# Patient Record
Sex: FEMALE | Race: WHITE | NOT HISPANIC OR LATINO
[De-identification: names, ages, dates, MRNs, and addresses within clinical notes are randomized per-mention and may not be internally consistent; named-entity substitution may affect disease eponyms.]

---

## 2021-04-12 ENCOUNTER — APPOINTMENT (OUTPATIENT)
Dept: DISASTER EMERGENCY | Facility: OTHER | Age: 81
End: 2021-04-12
Payer: MEDICARE

## 2021-04-12 PROCEDURE — 0012A: CPT

## 2021-12-12 ENCOUNTER — EMERGENCY (EMERGENCY)
Facility: HOSPITAL | Age: 81
LOS: 0 days | Discharge: HOME | End: 2021-12-13
Attending: EMERGENCY MEDICINE
Payer: MEDICARE

## 2021-12-12 VITALS
TEMPERATURE: 98 F | DIASTOLIC BLOOD PRESSURE: 77 MMHG | HEART RATE: 66 BPM | OXYGEN SATURATION: 95 % | WEIGHT: 164.91 LBS | HEIGHT: 62 IN | RESPIRATION RATE: 18 BRPM | SYSTOLIC BLOOD PRESSURE: 153 MMHG

## 2021-12-12 DIAGNOSIS — F41.9 ANXIETY DISORDER, UNSPECIFIED: ICD-10-CM

## 2021-12-12 DIAGNOSIS — G30.9 ALZHEIMER'S DISEASE, UNSPECIFIED: ICD-10-CM

## 2021-12-12 DIAGNOSIS — R51.9 HEADACHE, UNSPECIFIED: ICD-10-CM

## 2021-12-12 DIAGNOSIS — F03.90 UNSPECIFIED DEMENTIA WITHOUT BEHAVIORAL DISTURBANCE: ICD-10-CM

## 2021-12-12 DIAGNOSIS — R11.2 NAUSEA WITH VOMITING, UNSPECIFIED: ICD-10-CM

## 2021-12-12 LAB
ADD ON TEST-SPECIMEN IN LAB: SIGNIFICANT CHANGE UP
ALBUMIN SERPL ELPH-MCNC: 3.7 G/DL — SIGNIFICANT CHANGE UP (ref 3.3–5)
ALP SERPL-CCNC: 63 U/L — SIGNIFICANT CHANGE UP (ref 40–120)
ALT FLD-CCNC: 29 U/L — SIGNIFICANT CHANGE UP (ref 12–78)
ANION GAP SERPL CALC-SCNC: 5 MMOL/L — SIGNIFICANT CHANGE UP (ref 5–17)
APAP SERPL-MCNC: < 2 UG/ML (ref 10–30)
APPEARANCE UR: CLEAR — SIGNIFICANT CHANGE UP
AST SERPL-CCNC: 34 U/L — SIGNIFICANT CHANGE UP (ref 15–37)
BASOPHILS # BLD AUTO: 0.04 K/UL — SIGNIFICANT CHANGE UP (ref 0–0.2)
BASOPHILS NFR BLD AUTO: 0.6 % — SIGNIFICANT CHANGE UP (ref 0–2)
BILIRUB SERPL-MCNC: 0.4 MG/DL — SIGNIFICANT CHANGE UP (ref 0.2–1.2)
BILIRUB UR-MCNC: NEGATIVE — SIGNIFICANT CHANGE UP
BUN SERPL-MCNC: 10 MG/DL — SIGNIFICANT CHANGE UP (ref 7–23)
CALCIUM SERPL-MCNC: 9.5 MG/DL — SIGNIFICANT CHANGE UP (ref 8.5–10.1)
CHLORIDE SERPL-SCNC: 108 MMOL/L — SIGNIFICANT CHANGE UP (ref 96–108)
CO2 SERPL-SCNC: 28 MMOL/L — SIGNIFICANT CHANGE UP (ref 22–31)
COLOR SPEC: YELLOW — SIGNIFICANT CHANGE UP
CREAT SERPL-MCNC: 0.86 MG/DL — SIGNIFICANT CHANGE UP (ref 0.5–1.3)
DIFF PNL FLD: ABNORMAL
EOSINOPHIL # BLD AUTO: 0.24 K/UL — SIGNIFICANT CHANGE UP (ref 0–0.5)
EOSINOPHIL NFR BLD AUTO: 3.5 % — SIGNIFICANT CHANGE UP (ref 0–6)
ETHANOL SERPL-MCNC: <10 MG/DL — SIGNIFICANT CHANGE UP (ref 0–10)
GLUCOSE SERPL-MCNC: 112 MG/DL — HIGH (ref 70–99)
GLUCOSE UR QL: NEGATIVE MG/DL — SIGNIFICANT CHANGE UP
HCT VFR BLD CALC: 41.7 % — SIGNIFICANT CHANGE UP (ref 34.5–45)
HGB BLD-MCNC: 14.1 G/DL — SIGNIFICANT CHANGE UP (ref 11.5–15.5)
IMM GRANULOCYTES NFR BLD AUTO: 0.3 % — SIGNIFICANT CHANGE UP (ref 0–1.5)
KETONES UR-MCNC: NEGATIVE — SIGNIFICANT CHANGE UP
LEUKOCYTE ESTERASE UR-ACNC: NEGATIVE — SIGNIFICANT CHANGE UP
LYMPHOCYTES # BLD AUTO: 1.19 K/UL — SIGNIFICANT CHANGE UP (ref 1–3.3)
LYMPHOCYTES # BLD AUTO: 17.5 % — SIGNIFICANT CHANGE UP (ref 13–44)
MCHC RBC-ENTMCNC: 31.1 PG — SIGNIFICANT CHANGE UP (ref 27–34)
MCHC RBC-ENTMCNC: 33.8 GM/DL — SIGNIFICANT CHANGE UP (ref 32–36)
MCV RBC AUTO: 92.1 FL — SIGNIFICANT CHANGE UP (ref 80–100)
MONOCYTES # BLD AUTO: 0.66 K/UL — SIGNIFICANT CHANGE UP (ref 0–0.9)
MONOCYTES NFR BLD AUTO: 9.7 % — SIGNIFICANT CHANGE UP (ref 2–14)
NEUTROPHILS # BLD AUTO: 4.66 K/UL — SIGNIFICANT CHANGE UP (ref 1.8–7.4)
NEUTROPHILS NFR BLD AUTO: 68.4 % — SIGNIFICANT CHANGE UP (ref 43–77)
NITRITE UR-MCNC: NEGATIVE — SIGNIFICANT CHANGE UP
PH UR: 7 — SIGNIFICANT CHANGE UP (ref 5–8)
PLATELET # BLD AUTO: 246 K/UL — SIGNIFICANT CHANGE UP (ref 150–400)
POTASSIUM SERPL-MCNC: 4.5 MMOL/L — SIGNIFICANT CHANGE UP (ref 3.5–5.3)
POTASSIUM SERPL-SCNC: 4.5 MMOL/L — SIGNIFICANT CHANGE UP (ref 3.5–5.3)
PROT SERPL-MCNC: 7.4 GM/DL — SIGNIFICANT CHANGE UP (ref 6–8.3)
PROT UR-MCNC: NEGATIVE MG/DL — SIGNIFICANT CHANGE UP
RBC # BLD: 4.53 M/UL — SIGNIFICANT CHANGE UP (ref 3.8–5.2)
RBC # FLD: 13.2 % — SIGNIFICANT CHANGE UP (ref 10.3–14.5)
SALICYLATES SERPL-MCNC: <1.7 MG/DL — LOW (ref 2.8–20)
SODIUM SERPL-SCNC: 141 MMOL/L — SIGNIFICANT CHANGE UP (ref 135–145)
SP GR SPEC: 1.01 — SIGNIFICANT CHANGE UP (ref 1.01–1.02)
TSH SERPL-MCNC: 2.14 UU/ML — SIGNIFICANT CHANGE UP (ref 0.34–4.82)
UROBILINOGEN FLD QL: NEGATIVE MG/DL — SIGNIFICANT CHANGE UP
WBC # BLD: 6.81 K/UL — SIGNIFICANT CHANGE UP (ref 3.8–10.5)
WBC # FLD AUTO: 6.81 K/UL — SIGNIFICANT CHANGE UP (ref 3.8–10.5)

## 2021-12-12 PROCEDURE — 80307 DRUG TEST PRSMV CHEM ANLYZR: CPT

## 2021-12-12 PROCEDURE — 70450 CT HEAD/BRAIN W/O DYE: CPT | Mod: 26,MA

## 2021-12-12 PROCEDURE — 85610 PROTHROMBIN TIME: CPT

## 2021-12-12 PROCEDURE — 71045 X-RAY EXAM CHEST 1 VIEW: CPT

## 2021-12-12 PROCEDURE — 99285 EMERGENCY DEPT VISIT HI MDM: CPT

## 2021-12-12 PROCEDURE — 70450 CT HEAD/BRAIN W/O DYE: CPT | Mod: MA

## 2021-12-12 PROCEDURE — 93010 ELECTROCARDIOGRAM REPORT: CPT

## 2021-12-12 PROCEDURE — 36415 COLL VENOUS BLD VENIPUNCTURE: CPT

## 2021-12-12 PROCEDURE — 85652 RBC SED RATE AUTOMATED: CPT

## 2021-12-12 PROCEDURE — 85025 COMPLETE CBC W/AUTO DIFF WBC: CPT

## 2021-12-12 PROCEDURE — 80053 COMPREHEN METABOLIC PANEL: CPT

## 2021-12-12 PROCEDURE — 99283 EMERGENCY DEPT VISIT LOW MDM: CPT

## 2021-12-12 PROCEDURE — 99284 EMERGENCY DEPT VISIT MOD MDM: CPT | Mod: 25

## 2021-12-12 PROCEDURE — 85730 THROMBOPLASTIN TIME PARTIAL: CPT

## 2021-12-12 PROCEDURE — 81001 URINALYSIS AUTO W/SCOPE: CPT

## 2021-12-12 PROCEDURE — 93005 ELECTROCARDIOGRAM TRACING: CPT

## 2021-12-12 PROCEDURE — 71045 X-RAY EXAM CHEST 1 VIEW: CPT | Mod: 26

## 2021-12-12 PROCEDURE — 84443 ASSAY THYROID STIM HORMONE: CPT

## 2021-12-12 PROCEDURE — 87086 URINE CULTURE/COLONY COUNT: CPT

## 2021-12-12 RX ORDER — CITALOPRAM 10 MG/1
40 TABLET, FILM COATED ORAL DAILY
Refills: 0 | Status: DISCONTINUED | OUTPATIENT
Start: 2021-12-12 | End: 2021-12-13

## 2021-12-12 RX ORDER — QUETIAPINE FUMARATE 200 MG/1
25 TABLET, FILM COATED ORAL AT BEDTIME
Refills: 0 | Status: DISCONTINUED | OUTPATIENT
Start: 2021-12-12 | End: 2021-12-13

## 2021-12-12 RX ADMIN — QUETIAPINE FUMARATE 25 MILLIGRAM(S): 200 TABLET, FILM COATED ORAL at 20:13

## 2021-12-12 NOTE — ED PROVIDER NOTE - NSFOLLOWUPINSTRUCTIONS_ED_ALL_ED_FT
Follow-up with your regular physician  Return with any persistent/worsening symptoms.     Headache    A headache is pain or discomfort felt around the head or neck area. The specific cause of a headache may not be found as there are many types including tension headaches, migraine headaches, and cluster headaches. Watch your condition for any changes. Things you can do to manage your pain include taking over the counter and prescription medications as instructed by your health care provider, lying down in a dark quiet room, limiting stress, getting regular sleep, and refraining from alcohol and tobacco products.    SEEK IMMEDIATE MEDICAL CARE IF YOU HAVE ANY OF THE FOLLOWING SYMPTOMS: fever, vomiting, stiff neck, loss of vision, problems with speech, muscle weakness, loss of balance, trouble walking, passing out, or confusion.     Anxiety    WHAT YOU NEED TO KNOW:    Anxiety is a condition that causes you to feel extremely worried or nervous. The feelings are so strong that they can cause problems with your daily activities or sleep. Anxiety may be triggered by something you fear, or it may happen without a cause. Family or work stress, smoking, caffeine, and alcohol can increase your risk for anxiety. Certain medicines or health conditions can also increase your risk. Anxiety can become a long-term condition if it is not managed or treated.     DISCHARGE INSTRUCTIONS:    Call 911 if:     You have chest pain, tightness, or heaviness that may spread to your shoulders, arms, jaw, neck, or back.      You feel like hurting yourself or someone else.     Contact your healthcare provider if:     Your symptoms get worse or do not get better with treatment.      Your anxiety keeps you from doing your regular daily activities.      You have new symptoms since your last visit.      You have questions or concerns about your condition or care.    Medicines:     Medicines may be given to help you feel more calm and relaxed, and decrease your symptoms.      Take your medicine as directed. Contact your healthcare provider if you think your medicine is not helping or if you have side effects. Tell him of her if you are allergic to any medicine. Keep a list of the medicines, vitamins, and herbs you take. Include the amounts, and when and why you take them. Bring the list or the pill bottles to follow-up visits. Carry your medicine list with you in case of an emergency.    Follow up with your healthcare provider within 2 weeks or as directed: Write down your questions so you remember to ask them during your visits.    Manage anxiety:     Talk to someone about your anxiety. Your healthcare provider may suggest counseling. Cognitive behavioral therapy can help you understand and change how you react to events that trigger your symptoms. You might feel more comfortable talking with a friend or family member about your anxiety. Choose someone you know will be supportive and encouraging.      Find ways to relax. Activities such as exercise, meditation, or listening to music can help you relax. Spend time with friends, or do things you enjoy.      Practice deep breathing. Deep breathing can help you relax when you feel anxious. Focus on taking slow, deep breaths several times a day, or during an anxiety attack. Breathe in through your nose and out through your mouth.       Create a regular sleep routine. Regular sleep can help you feel calmer during the day. Go to sleep and wake up at the same times every day. Do not watch television or use the computer right before bed. Your room should be comfortable, dark, and quiet.       Eat a variety of healthy foods. Healthy foods include fruits, vegetables, low-fat dairy products, lean meats, fish, whole-grain breads, and cooked beans. Healthy foods can help you feel less anxious and have more energy.      Exercise regularly. Exercise can increase your energy level. Exercise may also lift your mood and help you sleep better. Your healthcare provider can help you create an exercise plan.      Do not smoke. Nicotine and other chemicals in cigarettes and cigars can increase anxiety. Ask your healthcare provider for information if you currently smoke and need help to quit. E-cigarettes or smokeless tobacco still contain nicotine. Talk to your healthcare provider before you use these products.       Do not have caffeine. Caffeine can make your symptoms worse. Do not have foods or drinks that are meant to increase your energy level.      Limit or do not drink alcohol. Ask your healthcare provider if alcohol is safe for you. You may not be able to drink alcohol if you take certain anxiety or depression medicines. Limit alcohol to 1 drink per day if you are a woman. Limit alcohol to 2 drinks per day if you are a man. A drink of alcohol is 12 ounces of beer, 5 ounces of wine, or 1½ ounces of liquor.       Do not use drugs. Drugs can make your anxiety worse. It can also make anxiety hard to manage. Talk to your healthcare provider if you use drugs and want help to quit. FOLLOW UP WITH PMD & PSYCHIATRIST  WITHIN 1-2DAYS, CALL TO MAKE APPOINTMENT  COME BACK TO ED IF YOUR CONDITION WORSENS OR IF YOU DEVELOP FEVER GREATER THAN 100.4F, CHEST PAIN,  SHORTNESS OF BREATH OR ANY OTHER SYMPTOMS CONCERNING TO YOU  TAKE TYLENOL (ACETAMINOPHEN) 650 MG EVERY 6 HOURS AS NEEDED FOR PAIN    IF YOU WERE PRESRCIBED ANY MEDICATIONS FROM TODAY'S VISIT, TAKE THEM AS DIRECTED (seroquel)      Follow-up with your regular physician  Return with any persistent/worsening symptoms.     Headache    A headache is pain or discomfort felt around the head or neck area. The specific cause of a headache may not be found as there are many types including tension headaches, migraine headaches, and cluster headaches. Watch your condition for any changes. Things you can do to manage your pain include taking over the counter and prescription medications as instructed by your health care provider, lying down in a dark quiet room, limiting stress, getting regular sleep, and refraining from alcohol and tobacco products.    SEEK IMMEDIATE MEDICAL CARE IF YOU HAVE ANY OF THE FOLLOWING SYMPTOMS: fever, vomiting, stiff neck, loss of vision, problems with speech, muscle weakness, loss of balance, trouble walking, passing out, or confusion.     Anxiety    WHAT YOU NEED TO KNOW:    Anxiety is a condition that causes you to feel extremely worried or nervous. The feelings are so strong that they can cause problems with your daily activities or sleep. Anxiety may be triggered by something you fear, or it may happen without a cause. Family or work stress, smoking, caffeine, and alcohol can increase your risk for anxiety. Certain medicines or health conditions can also increase your risk. Anxiety can become a long-term condition if it is not managed or treated.     DISCHARGE INSTRUCTIONS:    Call 911 if:     You have chest pain, tightness, or heaviness that may spread to your shoulders, arms, jaw, neck, or back.      You feel like hurting yourself or someone else.     Contact your healthcare provider if:     Your symptoms get worse or do not get better with treatment.      Your anxiety keeps you from doing your regular daily activities.      You have new symptoms since your last visit.      You have questions or concerns about your condition or care.    Medicines:     Medicines may be given to help you feel more calm and relaxed, and decrease your symptoms.      Take your medicine as directed. Contact your healthcare provider if you think your medicine is not helping or if you have side effects. Tell him of her if you are allergic to any medicine. Keep a list of the medicines, vitamins, and herbs you take. Include the amounts, and when and why you take them. Bring the list or the pill bottles to follow-up visits. Carry your medicine list with you in case of an emergency.    Follow up with your healthcare provider within 2 weeks or as directed: Write down your questions so you remember to ask them during your visits.    Manage anxiety:     Talk to someone about your anxiety. Your healthcare provider may suggest counseling. Cognitive behavioral therapy can help you understand and change how you react to events that trigger your symptoms. You might feel more comfortable talking with a friend or family member about your anxiety. Choose someone you know will be supportive and encouraging.      Find ways to relax. Activities such as exercise, meditation, or listening to music can help you relax. Spend time with friends, or do things you enjoy.      Practice deep breathing. Deep breathing can help you relax when you feel anxious. Focus on taking slow, deep breaths several times a day, or during an anxiety attack. Breathe in through your nose and out through your mouth.       Create a regular sleep routine. Regular sleep can help you feel calmer during the day. Go to sleep and wake up at the same times every day. Do not watch television or use the computer right before bed. Your room should be comfortable, dark, and quiet.       Eat a variety of healthy foods. Healthy foods include fruits, vegetables, low-fat dairy products, lean meats, fish, whole-grain breads, and cooked beans. Healthy foods can help you feel less anxious and have more energy.      Exercise regularly. Exercise can increase your energy level. Exercise may also lift your mood and help you sleep better. Your healthcare provider can help you create an exercise plan.      Do not smoke. Nicotine and other chemicals in cigarettes and cigars can increase anxiety. Ask your healthcare provider for information if you currently smoke and need help to quit. E-cigarettes or smokeless tobacco still contain nicotine. Talk to your healthcare provider before you use these products.       Do not have caffeine. Caffeine can make your symptoms worse. Do not have foods or drinks that are meant to increase your energy level.      Limit or do not drink alcohol. Ask your healthcare provider if alcohol is safe for you. You may not be able to drink alcohol if you take certain anxiety or depression medicines. Limit alcohol to 1 drink per day if you are a woman. Limit alcohol to 2 drinks per day if you are a man. A drink of alcohol is 12 ounces of beer, 5 ounces of wine, or 1½ ounces of liquor.       Do not use drugs. Drugs can make your anxiety worse. It can also make anxiety hard to manage. Talk to your healthcare provider if you use drugs and want help to quit.

## 2021-12-12 NOTE — ED PROVIDER NOTE - NEUROLOGICAL, MLM
Mild confusion but at baseline. No focal deficits, no motor or sensory deficits. +resting tremor in left hand.

## 2021-12-12 NOTE — ED STATDOCS - PROGRESS NOTE DETAILS
Brandi FERMIN for ED attending, Dr. Dia: 80 y/o F with PMHx of dementia presents to the ED BIB family for eval of +tremors and +HA. Also with reported increased +anxiety. Pt poor historian 2/2 baseline dementia, hx obtained from family at bedside. Will send pt to main ED for further evaluation.

## 2021-12-12 NOTE — ED ADULT TRIAGE NOTE - CHIEF COMPLAINT QUOTE
c/o headache, tremors and c/o pain behind eyes, was started on xanax and celexa recently, was taken off resperidone  HX: macular degeneration, dementia with psychosis, currently flat affect in triage

## 2021-12-12 NOTE — ED BEHAVIORAL HEALTH ASSESSMENT NOTE - RISK ASSESSMENT
Pt has become increasingly confused as per family increasing her risk to harm self. Family support is a protective factor. She has a HHA and family that cares for her. Low Acute Suicide Risk

## 2021-12-12 NOTE — ED PROVIDER NOTE - OBJECTIVE STATEMENT
80 y/o female with PMHx of macular degeneration, dementia presents to the ED c/o headache. Frontal HA w/ no associated n/v.  Pt endorses increased anxiety and tremor. Was recently taken off of risperidone and started on xanax and increased celexa. No recent falls/injuries, fevers.

## 2021-12-12 NOTE — ED BEHAVIORAL HEALTH ASSESSMENT NOTE - DETAILS
spoke with MD and RN denies suicidal ideations at this time spoke with RN and MD had visual hallucinations on Risperdal

## 2021-12-12 NOTE — ED BEHAVIORAL HEALTH ASSESSMENT NOTE - REASON
Pt is not at baseline. She has endorsed increasing anxiety, has been more confused and has endorsed suicidal ideations at home

## 2021-12-12 NOTE — ED ADULT NURSE NOTE - OBJECTIVE STATEMENT
patient presents with HA x one week.  Recent adjustment in SSRI, feeling tremulous, increased anxiety.

## 2021-12-12 NOTE — ED ADULT NURSE NOTE - NSIMPLEMENTINTERV_GEN_ALL_ED
Implemented All Fall Risk Interventions:  [place] to call system. Call bell, personal items and telephone within reach. Instruct patient to call for assistance. Room bathroom lighting operational. Non-slip footwear when patient is off stretcher. Physically safe environment: no spills, clutter or unnecessary equipment. Stretcher in lowest position, wheels locked, appropriate side rails in place. Provide visual cue, wrist band, yellow gown, etc. Monitor gait and stability. Monitor for mental status changes and reorient to person, place, and time. Review medications for side effects contributing to fall risk. Reinforce activity limits and safety measures with patient and family.

## 2021-12-12 NOTE — ED PROVIDER NOTE - PROGRESS NOTE DETAILS
CT, labs unremarkable (incl ESR 2). Patient awaiting psych eval.  Is expressing desire to be discharged -- no SI/HI, will try to get ETA from psych but would not hold her for eval if she insists on d/c. CT, labs unremarkable (incl ESR 2). Patient awaiting psych eval. No medical contraindication for psych eval/admission (if warranted). CC:  Signout received from Dr. Card to f/u Psych plan.  Telepsychiatry advises pt to be held overnight for Psych day team to reassess in AM.  Tele-Psych submitted psych med orders. pablito; dr baez saw pt & rec's d/c with seroquel, son comfortable taking home Ruy Angela: pt endorsed to me from prior physician PENDING: psych revval for final dispo

## 2021-12-12 NOTE — ED PROVIDER NOTE - PATIENT PORTAL LINK FT
You can access the FollowMyHealth Patient Portal offered by [facility] by registering at the following website: [email or web address]. By joining [facility]’s FollowMyHealth portal, you will also be able to view your health information using other applications (apps) compatible with our system.

## 2021-12-12 NOTE — ED ADULT NURSE REASSESSMENT NOTE - NS ED NURSE REASSESS COMMENT FT1
Patient resting in room with son at bedside. Pt appears anxious at this time. Patient updated on plan for new medication tonight before bed. Pt denies further needs at this time. Will continue to monitor.

## 2021-12-12 NOTE — ED BEHAVIORAL HEALTH ASSESSMENT NOTE - HPI (INCLUDE ILLNESS QUALITY, SEVERITY, DURATION, TIMING, CONTEXT, MODIFYING FACTORS, ASSOCIATED SIGNS AND SYMPTOMS)
Pt is a 81 year old female, domiciled with  who also suffers from dementia, with past psychiatric history of dementia, and PMH of macular degeneration, who was BIB family for evaluation of worsening cognition and anxiety.     Pt reported she is doing okay. She does feel anxious. She wants to go back home. She denied any other complaints or concerns. She denied suicidal or homicidal ideations, AVH at this time.     Collateral obtained from sons Ludwig and José who reported that pt behavior has changed over the past month. She is staying up at night and has become increasingly anxious. She started endorsing suicidal ideations earlier this week stating "I don't want to live anymore.. I don't feel like me." They reported pt was started on Risperdal a month ago by her PMD which was later discontinued as she started experiencing visual hallucinations. She was started on Xanax 0.25mg a week ago and her Celexa was increased from 40mg to 60mg. Her anxiety has worsened since.     We discussed medication changes and decided to stop xanax and decrease the celexa back to 40mg since pt was stable on that dose for many years. We will start Seroquel 25mg po qpm to target her mood and sleep. Risks/benefits of medication were discussed with family who were in agreement with the plan. Pt will be kept under observation and reevaluated.

## 2021-12-12 NOTE — ED BEHAVIORAL HEALTH ASSESSMENT NOTE - SUMMARY
Pt is a 81 year old female with dementia who was brought in by family for assessment of her worsening anxiety and mood symptoms. Pt has been increasingly confused at home. She has had multiple medication changes in the last month which have probably contributed to her current symptoms. We discussed discontinuing xanax and decreasing Celexa to 40mg po daily. Pt will be started on Seroquel 25mg po bedtime to target her mood symptoms and insomnia.    Plan:   Pt will be kept under observation  Discontinue Xanax  Start Celexa 40mg po daily for depression  Start Seroquel 25mg po bedtime for aggression/mood symptoms/insomnia  Continue medical medications - Levothyroxine 25mcg po daily and Pravastatin 40mg po qpm  f/u labs and UA  Pt to be reevaluated by CL team

## 2021-12-13 VITALS
RESPIRATION RATE: 18 BRPM | HEART RATE: 70 BPM | DIASTOLIC BLOOD PRESSURE: 65 MMHG | SYSTOLIC BLOOD PRESSURE: 144 MMHG | TEMPERATURE: 98 F | OXYGEN SATURATION: 97 %

## 2021-12-13 LAB
CULTURE RESULTS: SIGNIFICANT CHANGE UP
SPECIMEN SOURCE: SIGNIFICANT CHANGE UP

## 2021-12-13 RX ORDER — QUETIAPINE FUMARATE 200 MG/1
1 TABLET, FILM COATED ORAL
Qty: 14 | Refills: 0
Start: 2021-12-13 | End: 2021-12-26

## 2021-12-13 NOTE — PROGRESS NOTE BEHAVIORAL HEALTH - NSBHFUPINTERVALHXFT_PSY_A_CORE
Son Ludwig at the bedside.   Pt is sitting in the chair. Son state s[pt si very anxious. Pt agrees with that  and also feels depressed. She slept well last night wits Seroquel 25 mg. Celexa was lowered to 40mg. Pt denies any type of SI, HI, Ah, Vh. She has no PI.    PT has dementia for a "several years " but at the state of forgetting her  , who is also demented, not verbal and homes withy living  in aide.   PT is easily overwhelmed with catastrophic responses , tries to leave the house.  Family already consulted eldercare  . pt has medicaid  bot not her own aide.   She has short and long ter memory problems

## 2021-12-13 NOTE — PROGRESS NOTE BEHAVIORAL HEALTH - SUMMARY
Pt is a 81 year old female with dementia who was brought in by family for assessment of her worsening anxiety and mood symptoms. Pt has been increasingly confused at home. She has had multiple medication changes in the last month which have probably contributed to her current symptoms. We discussed discontinuing xanax and decreasing Celexa to 40mg po daily. Pt will be started on Seroquel 25mg po bedtime to target her mood symptoms and insomnia.    PT is not at imminent t risk to harm self and others and she does not need inpatient treatment.   Plan:   discussed with son ad Dr Fran Redmond.   Would continue with Seroquel 25 mg at HS since pt had a good response. Side effects and benefits discussed. Son agrees.   Provided with phone number for [facility] [phone] and advised to follow up with geriatric psychiatry. Son agrees and will  mark for [id number].   PT is psychiatrically  cleared for d/c.

## 2021-12-13 NOTE — ED ADULT NURSE REASSESSMENT NOTE - NS ED NURSE REASSESS COMMENT FT1
Patient able to ambulate to bathroom assist x1. Patient appears calm and relaxed at this time. Will continue to monitor.

## 2021-12-13 NOTE — ED ADULT NURSE REASSESSMENT NOTE - NS ED NURSE REASSESS COMMENT FT1
Patient resting comfortably in bed. No distress noted. Respirations even and unlabored. Skin warm and dry. Will continue to monitor.

## 2021-12-13 NOTE — PROGRESS NOTE BEHAVIORAL HEALTH - NSBHCHARTREVIEWLAB_PSY_A_CORE FT
14.1   6.81  )-----------( 246      ( 12 Dec 2021 14:33 )             41.7   12-12    141  |  108  |  10  ----------------------------<  112<H>  4.5   |  28  |  0.86    Ca    9.5      12 Dec 2021 14:33    TPro  7.4  /  Alb  3.7  /  TBili  0.4  /  DBili  x   /  AST  34  /  ALT  29  /  AlkPhos  63  12-12

## 2021-12-13 NOTE — PROGRESS NOTE BEHAVIORAL HEALTH - RISK ASSESSMENT
Low acute level: NO SI, gets anxious and catastrophes , demented, short and long term memory problem executive functions problems.   Low long term risk.; PT has no previous psychiatric hx, hx of SA and makes vague states that she does not want to live any more. Family is supportive.   Protective factors: supportive family.  Mitigation: OPD f/u

## 2021-12-13 NOTE — PROGRESS NOTE BEHAVIORAL HEALTH - NSBHCHARTREVIEWINVESTIGATE_PSY_A_CORE FT
entricular Rate 64 BPM    Atrial Rate 64 BPM    P-R Interval 236 ms    QRS Duration 70 ms    Q-T Interval 388 ms    QTC Calculation(Bazett) 400 ms    P Axis 27 degrees    R Axis -2 degrees    T Axis 7 degrees    Diagnosis Line Sinus rhythm with 1st degree A-V block  Inferior infarct , age undetermined  Anterior infarct , age undetermined  Abnormal ECG

## 2021-12-13 NOTE — PROGRESS NOTE BEHAVIORAL HEALTH - NSBHCONSULTFOLLOWAFTERCARE_PSY_A_CORE FT
Provided with phone number for [facility] [phone] and advised to follow up with geriatric psychiatry. Son agrees and will  mark for [id number].   PT is psychiatrically  cleared for d/c.

## 2021-12-13 NOTE — ED ADULT NURSE REASSESSMENT NOTE - NS ED NURSE REASSESS COMMENT FT1
Received patient from Brittnee VAZQUEZ pt resting in bed comfortable with no complaints. will continue to monitor

## 2021-12-13 NOTE — PROGRESS NOTE BEHAVIORAL HEALTH - NSBHCHARTREVIEWIMAGING_PSY_A_CORE FT
INTERPRETATION:  Noncontrast CT of the brain.    CLINICAL INDICATION:  Headache and tremor    TECHNIQUE : Axial CT scanning of the brain was obtained from the skull   base to the vertex without the administration of intravenous contrast.    COMPARISON: None available    FINDINGS:  No acute hemorrhage, hydrocephalus, midline shift or   extra-axial collections are identified. Age-appropriate involutional   changes are present. Patchy areas of decreased attenuation are noted   within the white matter likely representing microvascular ischemic change.    The orbits are not remarkable in appearance.    The visualized paranasal sinuses and tympanomastoid cavities are free of   acute disease.    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    IMPRESSION:    No acute hemorrhage, extra-axial collection, mass effect or hydrocephalus.

## 2021-12-13 NOTE — PROGRESS NOTE BEHAVIORAL HEALTH - NSBHCHARTREVIEWVS_PSY_A_CORE FT
Vital Signs Last 24 Hrs  T(C): 36.8 (13 Dec 2021 10:02), Max: 36.9 (12 Dec 2021 13:31)  T(F): 98.3 (13 Dec 2021 10:02), Max: 98.5 (12 Dec 2021 13:31)  HR: 70 (13 Dec 2021 10:02) (62 - 73)  BP: 144/65 (13 Dec 2021 10:02) (144/65 - 155/72)  BP(mean): 90 (13 Dec 2021 07:20) (90 - 94)  RR: 18 (13 Dec 2021 10:02) (18 - 20)  SpO2: 97% (13 Dec 2021 10:02) (95% - 97%)

## 2022-02-23 ENCOUNTER — EMERGENCY (EMERGENCY)
Facility: HOSPITAL | Age: 82
LOS: 0 days | Discharge: HOME | End: 2022-02-23
Attending: EMERGENCY MEDICINE
Payer: MEDICARE

## 2022-02-23 VITALS
RESPIRATION RATE: 16 BRPM | OXYGEN SATURATION: 96 % | HEART RATE: 82 BPM | TEMPERATURE: 99 F | SYSTOLIC BLOOD PRESSURE: 133 MMHG | DIASTOLIC BLOOD PRESSURE: 76 MMHG

## 2022-02-23 VITALS — WEIGHT: 119.93 LBS | HEIGHT: 62 IN

## 2022-02-23 DIAGNOSIS — R41.0 DISORIENTATION, UNSPECIFIED: ICD-10-CM

## 2022-02-23 DIAGNOSIS — R44.3 HALLUCINATIONS, UNSPECIFIED: ICD-10-CM

## 2022-02-23 DIAGNOSIS — F03.91 UNSPECIFIED DEMENTIA WITH BEHAVIORAL DISTURBANCE: ICD-10-CM

## 2022-02-23 DIAGNOSIS — B96.89 OTHER SPECIFIED BACTERIAL AGENTS AS THE CAUSE OF DISEASES CLASSIFIED ELSEWHERE: ICD-10-CM

## 2022-02-23 DIAGNOSIS — N39.0 URINARY TRACT INFECTION, SITE NOT SPECIFIED: ICD-10-CM

## 2022-02-23 LAB
ALBUMIN SERPL ELPH-MCNC: 3.2 G/DL — LOW (ref 3.3–5)
ALP SERPL-CCNC: 72 U/L — SIGNIFICANT CHANGE UP (ref 40–120)
ALT FLD-CCNC: 22 U/L — SIGNIFICANT CHANGE UP (ref 12–78)
ANION GAP SERPL CALC-SCNC: 7 MMOL/L — SIGNIFICANT CHANGE UP (ref 5–17)
APAP SERPL-MCNC: <2 UG/ML — LOW (ref 10–30)
APPEARANCE UR: CLEAR — SIGNIFICANT CHANGE UP
AST SERPL-CCNC: 42 U/L — HIGH (ref 15–37)
BASOPHILS # BLD AUTO: 0.04 K/UL — SIGNIFICANT CHANGE UP (ref 0–0.2)
BASOPHILS NFR BLD AUTO: 0.6 % — SIGNIFICANT CHANGE UP (ref 0–2)
BILIRUB SERPL-MCNC: 0.9 MG/DL — SIGNIFICANT CHANGE UP (ref 0.2–1.2)
BILIRUB UR-MCNC: NEGATIVE — SIGNIFICANT CHANGE UP
BUN SERPL-MCNC: 10 MG/DL — SIGNIFICANT CHANGE UP (ref 7–23)
CALCIUM SERPL-MCNC: 9 MG/DL — SIGNIFICANT CHANGE UP (ref 8.5–10.1)
CHLORIDE SERPL-SCNC: 102 MMOL/L — SIGNIFICANT CHANGE UP (ref 96–108)
CO2 SERPL-SCNC: 28 MMOL/L — SIGNIFICANT CHANGE UP (ref 22–31)
COLOR SPEC: YELLOW — SIGNIFICANT CHANGE UP
CREAT SERPL-MCNC: 0.94 MG/DL — SIGNIFICANT CHANGE UP (ref 0.5–1.3)
DIFF PNL FLD: ABNORMAL
EOSINOPHIL # BLD AUTO: 0.01 K/UL — SIGNIFICANT CHANGE UP (ref 0–0.5)
EOSINOPHIL NFR BLD AUTO: 0.1 % — SIGNIFICANT CHANGE UP (ref 0–6)
ETHANOL SERPL-MCNC: <10 MG/DL — SIGNIFICANT CHANGE UP (ref 0–10)
GLUCOSE SERPL-MCNC: 119 MG/DL — HIGH (ref 70–99)
GLUCOSE UR QL: NEGATIVE — SIGNIFICANT CHANGE UP
HCT VFR BLD CALC: 35.8 % — SIGNIFICANT CHANGE UP (ref 34.5–45)
HGB BLD-MCNC: 12.1 G/DL — SIGNIFICANT CHANGE UP (ref 11.5–15.5)
IMM GRANULOCYTES NFR BLD AUTO: 0.3 % — SIGNIFICANT CHANGE UP (ref 0–1.5)
KETONES UR-MCNC: ABNORMAL
LEUKOCYTE ESTERASE UR-ACNC: ABNORMAL
LYMPHOCYTES # BLD AUTO: 0.85 K/UL — LOW (ref 1–3.3)
LYMPHOCYTES # BLD AUTO: 12.6 % — LOW (ref 13–44)
MCHC RBC-ENTMCNC: 30.3 PG — SIGNIFICANT CHANGE UP (ref 27–34)
MCHC RBC-ENTMCNC: 33.8 GM/DL — SIGNIFICANT CHANGE UP (ref 32–36)
MCV RBC AUTO: 89.7 FL — SIGNIFICANT CHANGE UP (ref 80–100)
MONOCYTES # BLD AUTO: 0.87 K/UL — SIGNIFICANT CHANGE UP (ref 0–0.9)
MONOCYTES NFR BLD AUTO: 12.9 % — SIGNIFICANT CHANGE UP (ref 2–14)
NEUTROPHILS # BLD AUTO: 4.94 K/UL — SIGNIFICANT CHANGE UP (ref 1.8–7.4)
NEUTROPHILS NFR BLD AUTO: 73.5 % — SIGNIFICANT CHANGE UP (ref 43–77)
NITRITE UR-MCNC: NEGATIVE — SIGNIFICANT CHANGE UP
PH UR: 7 — SIGNIFICANT CHANGE UP (ref 5–8)
PLATELET # BLD AUTO: 202 K/UL — SIGNIFICANT CHANGE UP (ref 150–400)
POTASSIUM SERPL-MCNC: 3.6 MMOL/L — SIGNIFICANT CHANGE UP (ref 3.5–5.3)
POTASSIUM SERPL-SCNC: 3.6 MMOL/L — SIGNIFICANT CHANGE UP (ref 3.5–5.3)
PROT SERPL-MCNC: 6.7 GM/DL — SIGNIFICANT CHANGE UP (ref 6–8.3)
PROT UR-MCNC: 15
RBC # BLD: 3.99 M/UL — SIGNIFICANT CHANGE UP (ref 3.8–5.2)
RBC # FLD: 13.2 % — SIGNIFICANT CHANGE UP (ref 10.3–14.5)
SALICYLATES SERPL-MCNC: <1.7 MG/DL — LOW (ref 2.8–20)
SODIUM SERPL-SCNC: 137 MMOL/L — SIGNIFICANT CHANGE UP (ref 135–145)
SP GR SPEC: 1 — LOW (ref 1.01–1.02)
TROPONIN I, HIGH SENSITIVITY RESULT: 11.52 NG/L — SIGNIFICANT CHANGE UP
UROBILINOGEN FLD QL: NEGATIVE — SIGNIFICANT CHANGE UP
WBC # BLD: 6.73 K/UL — SIGNIFICANT CHANGE UP (ref 3.8–10.5)
WBC # FLD AUTO: 6.73 K/UL — SIGNIFICANT CHANGE UP (ref 3.8–10.5)

## 2022-02-23 PROCEDURE — 84484 ASSAY OF TROPONIN QUANT: CPT

## 2022-02-23 PROCEDURE — 99285 EMERGENCY DEPT VISIT HI MDM: CPT

## 2022-02-23 PROCEDURE — 93005 ELECTROCARDIOGRAM TRACING: CPT

## 2022-02-23 PROCEDURE — 99285 EMERGENCY DEPT VISIT HI MDM: CPT | Mod: 25

## 2022-02-23 PROCEDURE — 81001 URINALYSIS AUTO W/SCOPE: CPT

## 2022-02-23 PROCEDURE — 85025 COMPLETE CBC W/AUTO DIFF WBC: CPT

## 2022-02-23 PROCEDURE — 70450 CT HEAD/BRAIN W/O DYE: CPT | Mod: 26,MA

## 2022-02-23 PROCEDURE — 36415 COLL VENOUS BLD VENIPUNCTURE: CPT

## 2022-02-23 PROCEDURE — 80053 COMPREHEN METABOLIC PANEL: CPT

## 2022-02-23 PROCEDURE — 71045 X-RAY EXAM CHEST 1 VIEW: CPT | Mod: 26

## 2022-02-23 PROCEDURE — 84443 ASSAY THYROID STIM HORMONE: CPT

## 2022-02-23 PROCEDURE — 93010 ELECTROCARDIOGRAM REPORT: CPT

## 2022-02-23 PROCEDURE — 71045 X-RAY EXAM CHEST 1 VIEW: CPT

## 2022-02-23 PROCEDURE — 80307 DRUG TEST PRSMV CHEM ANLYZR: CPT

## 2022-02-23 PROCEDURE — 70450 CT HEAD/BRAIN W/O DYE: CPT | Mod: MA

## 2022-02-23 PROCEDURE — 87040 BLOOD CULTURE FOR BACTERIA: CPT

## 2022-02-23 PROCEDURE — 87086 URINE CULTURE/COLONY COUNT: CPT

## 2022-02-23 RX ORDER — HALOPERIDOL DECANOATE 100 MG/ML
5 INJECTION INTRAMUSCULAR ONCE
Refills: 0 | Status: DISCONTINUED | OUTPATIENT
Start: 2022-02-23 | End: 2022-02-23

## 2022-02-23 RX ORDER — NITROFURANTOIN MACROCRYSTAL 50 MG
1 CAPSULE ORAL
Qty: 14 | Refills: 0
Start: 2022-02-23 | End: 2022-03-01

## 2022-02-23 RX ORDER — MIRTAZAPINE 45 MG/1
15 TABLET, ORALLY DISINTEGRATING ORAL AT BEDTIME
Refills: 0 | Status: DISCONTINUED | OUTPATIENT
Start: 2022-02-23 | End: 2022-02-23

## 2022-02-23 RX ORDER — NITROFURANTOIN MACROCRYSTAL 50 MG
100 CAPSULE ORAL ONCE
Refills: 0 | Status: COMPLETED | OUTPATIENT
Start: 2022-02-23 | End: 2022-02-23

## 2022-02-23 RX ORDER — QUETIAPINE FUMARATE 200 MG/1
25 TABLET, FILM COATED ORAL ONCE
Refills: 0 | Status: COMPLETED | OUTPATIENT
Start: 2022-02-23 | End: 2022-02-23

## 2022-02-23 RX ADMIN — Medication 1 MILLIGRAM(S): at 15:52

## 2022-02-23 RX ADMIN — QUETIAPINE FUMARATE 25 MILLIGRAM(S): 200 TABLET, FILM COATED ORAL at 15:52

## 2022-02-23 RX ADMIN — Medication 100 MILLIGRAM(S): at 23:08

## 2022-02-23 RX ADMIN — MIRTAZAPINE 15 MILLIGRAM(S): 45 TABLET, ORALLY DISINTEGRATING ORAL at 21:04

## 2022-02-23 NOTE — ED ADULT NURSE REASSESSMENT NOTE - NS ED NURSE REASSESS COMMENT FT1
Pt calm and resting comfortably. No complaints at this time. VSS, respirations equal and unlabored. HR 82 and /76. Son at the bedside.

## 2022-02-23 NOTE — ED BEHAVIORAL HEALTH ASSESSMENT NOTE - HPI (INCLUDE ILLNESS QUALITY, SEVERITY, DURATION, TIMING, CONTEXT, MODIFYING FACTORS, ASSOCIATED SIGNS AND SYMPTOMS)
81 year-old  female, with history of Dementia, Anxiety, Depression, history of Celexa for ~ 40 years, no in-patient hospitalization, no suicidal ideation or suicide attempt, referred and brought in by family for anxiety, emotional distress, insomnia.    Patient is alert to self, but not time, place or situation. Patient has fluctuating sensorium, inattention. Patient is restless, anxious. Patient given Seroquel 25 mg and Ativan 1 mg: Patient anxiety reduced however remains pacing in room.     Collateral reports worsening of anxiety symptoms ~ December, becoming more difficult to manage at home. Patient has been labile, anxious, crying, asking for God to take her; however no self-injurious behaviors or suicide attempt; no threats of ending life. Patient has not been sleeping well. On way here, patient was talking to glove compartment, "hallucinating." 81 year-old  female, with history of Dementia, Anxiety, Depression, history of Celexa for ~ 40 years, no in-patient hospitalization, no suicidal ideation or suicide attempt, referred and brought in by family for anxiety, emotional distress, insomnia.    Patient is alert to self, but not time, place or situation. Patient has fluctuating sensorium, inattention. Patient is restless, anxious. Patient given Seroquel 25 mg and Ativan 1 mg: Patient anxiety reduced however remains pacing in room.     Collateral reports worsening of anxiety symptoms ~ December, becoming more difficult to manage at home. Patient has been labile, anxious, crying, asking for God to take her; however no self-injurious behaviors or suicide attempt; no threats of ending life. Patient has not been sleeping well. On way here, patient was talking to glove compartment, "hallucinating." Patient symptoms worsening ~ 12.2021 and medications have not helped much. Family seeking medication changes / augmentation.

## 2022-02-23 NOTE — ED BEHAVIORAL HEALTH ASSESSMENT NOTE - DESCRIPTION
As per HPI As per HPI     Vital Signs Last 24 Hrs  T(C): 37.2 (23 Feb 2022 14:07), Max: 37.2 (23 Feb 2022 14:07)  T(F): 98.9 (23 Feb 2022 14:07), Max: 98.9 (23 Feb 2022 14:07)  HR: 99 (23 Feb 2022 14:07) (99 - 99)  BP: 124/94 (23 Feb 2022 14:07) (124/94 - 124/94)  BP(mean): 103 (23 Feb 2022 14:07) (103 - 103)  RR: 18 (23 Feb 2022 14:07) (18 - 18)  SpO2: 95% (23 Feb 2022 14:07) (95% - 95%)

## 2022-02-23 NOTE — ED ADULT TRIAGE NOTE - CHIEF COMPLAINT QUOTE
pt presents to ED for possible pysch eval. As per grandson pt is rambling, not making sense, and having hallucinations. pt has been dealing with severe anxiety and wishing she were dead. pt experiencing new onset of loss of balance/ coordination and decreased PO intake. pt psychiatrist ANNE Barajas ([facility]) #[phone]. Family requesting r/o UTI.

## 2022-02-23 NOTE — ED ADULT NURSE NOTE - OBJECTIVE STATEMENT
pt presents to ED for possible pysch eval. As per grandson pt is rambling, not making sense, and having hallucinations. pt has been dealing with severe anxiety and wishing she were dead. pt experiencing new onset of loss of balance/ coordination and decreased PO intake. pt psychiatrist ANNE Barajas ([facility]) #[phone]. Family requesting r/o UTI.

## 2022-02-23 NOTE — ED PROVIDER NOTE - PSYCHIATRIC, MLM
Alert and oriented to person, place, time/situation. normal mood and affect. no apparent risk to self or others. Alert and oriented x1. Pt walking around room and hallucinating.

## 2022-02-23 NOTE — ED ADULT NURSE NOTE - CHIEF COMPLAINT QUOTE
pt presents to ED for possible pysch eval. As per grandson pt is rambling, not making sense, and having hallucinations. pt has been dealing with severe anxiety and wishing she were dead. pt experiencing new onset of loss of balance/ coordination and decreased PO intake. pt psychiatrist ANNE Barajas ([facility]) #[phone]. Family requesting r/o UTI.

## 2022-02-23 NOTE — ED BEHAVIORAL HEALTH ASSESSMENT NOTE - CURRENT MEDICATION
Lexapro 10 mg daily, Seroquel 25 mg daily and 50 mg at bedtime, Remeron 15 mg at bedtime  Vitamin / Supplements: L-Theanine, Ashwaghanda, Melatonin

## 2022-02-23 NOTE — ED PROVIDER NOTE - OBJECTIVE STATEMENT
80 y/o female with a PMHx of Alzheimer's presents to the ED for psych eval. Per family member at bedside, pt has been confused and having hallucinations. Denies abd pain. No other complaints at this time.

## 2022-02-23 NOTE — ED PROVIDER NOTE - CONSTITUTIONAL, MLM
normal... Well appearing, awake, alert, oriented to person, place, time/situation and in no apparent distress. Well appearing, awake, alert, oriented x1 and in no apparent distress.

## 2022-02-23 NOTE — ED ADULT NURSE NOTE - NSFALLRSKPASTHIST_ED_ALL_ED
Patient:   MADELYN MOSS            MRN: [id number]            FIN: [id number]              Age:   86 years     Sex:  MALE     :  37   Associated Diagnoses:   None   Author:   ROBERT ALEGRE     Subjective   Seen and examined patient.  Patient awake, alert, reports no hallucinations today     Health Status   Allergies:    Allergies (1) Active Reaction  NKA None Documented    Current medications:    Medications (11) Active  Scheduled: (10)  AmLODIPine 5 mg tab  5 mg 1 tab, Oral, Daily  Brimonidine-brinzolamide 0.2-1%  ophth susp 8 mL  1 drop, Each Eye, Q12H  Budesonide-formoterol 160-4.5 mcg oral MDI 6 gm  2 puff, MDI/DPI, Q12H  Enoxaparin 40 mg/0.4 mL syringe  40 mg 0.4 mL, Subcutaneous, Daily  Folic acid 1 mg tab  1 mg 1 tab, Oral, Daily  Furosemide 20 mg tab  20 mg 1 tab, Oral, Daily  Lisinopril 20 mg tab  20 mg 1 tab, Oral, Daily  Multivitamin with minerals chew tab  1 tab, Chewed, Daily  thiamine (B1)  500 mg 5 mL, IVPB, Q8H (variable)  thiamine (B1)  250 mg 2.5 mL, IVPB, Daily  Continuous: (0)  PRN: (1)  Albuterol HFA 90 mcg oral MDI 8 gm  180 mcg 2 puff, Inhaled, Q6H (variable)      Review of Systems   All other systems ROS reviewed as documented in chart.     Objective   Intake and Output   I&O 24 hr   I & O between:  10-FEB-2020 13:03 TO 2020 13:03  Med Dosing Weight:  83.7  kg   2020  24 Hour Intake:   700.00  ( 8.36 mL/kg )  24 Hour Output:   950.00           24 Hour Urine/Stool Output:   0.0  24 Hour Balance:   -250.00           24 Hour Urine Output:   950.00  ( 0.47 mL/kg/hr )                   Urine Count:  3.00    Stool Count:  1       VS/Measurements     Vitals between:   10-FEB-2020 13:03:47   TO   2020 13:03:47                   LAST RESULT MINIMUM MAXIMUM  Temperature 36.4 36.4 37.2  Heart Rate 75 67 88  Respiratory Rate 21 11 25  NISBP           103 99 161  NIDBP           64 60 95  NIMBP           74 71 102  SpO2                    97 92  99    Neurologic:    Neurologic exam: Patient alert, oriented to person, knows he is in the hospital but was unable to name the hospital.  Not oriented to time.  Was able to name the president.  Had 0/3 recall.  Unable to perform simple calculation.  Speech was hypophonic.  Patient able to repeat and follow commands.  Cranial nerve exam: Pupils equal and reactive to light, extraocular movements intact, no nystagmus seen.  Face looks readily symmetrical.  Uvula midline, palatal elevation normal, tongue movements normal.  Motor: No drift seen.  Strength grossly intact in bilateral upper and lower extremities with limited testing of foot plantarflexion dorsiflexion due to patient reporting pain in his feet.  Sensations: Intact bilaterally  Reflexes: No pathological reflexes noted.  Negative Johnathon sign.  Plantars were equivocal.  Cerebellar: Mild dysmetria noted in bilateral upper extremities.   Gait was not tested.         Results Review   Labs between:  10-FEB-2020 13:03 to 11-FEB-2020 13:03  CBC:                 WBC  HgB  Hct  Plt  MCV  RDW   11-FEB-2020 8.4  14.6  43.5  161  91.2  14.3   DIFF:                 Seg  Neutroph//ABS  Lymph//ABS  Mono//ABS  EOS/ABS  11-FEB-2020 NOT APPLICABLE  62 // 5.3 20 // 1.7 10 // 0.8 7 // (H) 0.6  BMP:                 Na  Cl  BUN  Glu   11-FEB-2020 144  (H) 112  14  87                              K  CO2  Cr  Ca                              4.1  26  0.99  (L) 8.2   CMP:                 AST  ALT  AlkPhos  Bili  Albumin   11-FEB-2020 15  15  69  0.5  (L) 2.9   Other Chem:             Mg  Phos  Triglycerides  GGTP  DirectBili                           2.1  2.5         POC GLU:                 Latest Result  Latest Date  Minimum  Min Date  Maximum  Max Date                             (H) 122  11-FEB-2020 (H) 122  11-FEB-2020 (H) 127  10-FEB-2020                       Impression and Plan   86-year-old gentleman who was admitted to the hospital for evaluation of visual hallucinations.   On review of patient's medical record from his last ER visit, prednisone showed a as 1 of his home medications.  Steroids can cause psychosis and his presentation could be related to a side effect of steroid use.   There is also concern for underlying dementia.  EEG does not show any significant epileptiform activity and imaging study of the brain also did not show any significant structural abnormality/stroke  --Ordered B12, RPR to rule out secondary causes of underlying dementia.  --We will continue thiamine 500 mg IV 3 times daily for 2 days followed by thiamine 250 mg daily.  --Start patient on donepezil 5 mg nightly  We will follow-up.   no

## 2022-02-23 NOTE — ED PROVIDER NOTE - PATIENT PORTAL LINK FT
You can access the FollowMyHealth Patient Portal offered by [facility] by registering at the following website: [email or web address]. By joining [employer]’s FollowMyHealth portal, you will also be able to view your health information using other applications (apps) compatible with our system.

## 2022-02-23 NOTE — ED BEHAVIORAL HEALTH ASSESSMENT NOTE - SUMMARY
81 year-old  female, with history of Dementia, Anxiety, Depression, history of Celexa for ~ 40 years, no in-patient hospitalization, no suicidal ideation or suicide attempt, referred and brought in by family for anxiety, emotional distress, insomnia. 81 year-old  female, with history of Dementia, Anxiety, Depression, history of Celexa for ~ 40 years, no in-patient hospitalization, no suicidal ideation or suicide attempt, referred and brought in by family for anxiety, emotional distress, insomnia.    Patient presentation indicative Dementia with behavioral disturbances. Mentation is expected to improve with treatment of underlying UTI. Patient symptoms not indicating imminent risk for harm to self; not warranting involuntary in-patient hospitalization. 81 year-old  female, with history of Dementia, Anxiety, Depression, history of Celexa for ~ 40 years, no in-patient hospitalization, no suicidal ideation or suicide attempt, referred and brought in by family for anxiety, emotional distress, insomnia.    Patient presentation indicative Dementia with behavioral disturbances. Mentation is expected to improve with treatment of underlying UTI that is muddling and exacerbating behavioral disturbances. Patient symptoms not indicating imminent risk for harm to self; not warranting involuntary in-patient hospitalization.

## 2022-02-23 NOTE — ED BEHAVIORAL HEALTH ASSESSMENT NOTE - DETAILS
As per HPI Son instructed to return to the ED or call 911 if patient experiences SI, HI, hopelessness, worsening of symptoms or has any other concerns. family

## 2022-02-23 NOTE — ED ADULT NURSE NOTE - NSIMPLEMENTINTERV_GEN_ALL_ED
Implemented All Fall Risk Interventions:  [place] to call system. Call bell, personal items and telephone within reach. Instruct patient to call for assistance. Room bathroom lighting operational. Non-slip footwear when patient is off stretcher. Physically safe environment: no spills, clutter or unnecessary equipment. Stretcher in lowest position, wheels locked, appropriate side rails in place. Provide visual cue, wrist band, yellow gown, etc. Monitor gait and stability. Monitor for mental status changes and reorient to person, place, and time. Review medications for side effects contributing to fall risk. Reinforce activity limits and safety measures with patient and family.

## 2022-02-23 NOTE — ED BEHAVIORAL HEALTH ASSESSMENT NOTE - MEDICATIONS (PRESCRIPTIONS, DIRECTIONS)
Seroquel 25 mg morning, 25 mg afternoon, 50 mg at bedtime. Ativan 0.5 mg three times daily (with Seroquel). Remeron 15 mg at bedtime. Depakote 250 mg daily. Lexapro 10 mg daily.

## 2022-02-24 LAB
CULTURE RESULTS: NO GROWTH — SIGNIFICANT CHANGE UP
SPECIMEN SOURCE: SIGNIFICANT CHANGE UP

## 2022-02-24 RX ORDER — PROPRANOLOL HCL 160 MG
1 CAPSULE, EXTENDED RELEASE 24HR ORAL
Qty: 15 | Refills: 0
Start: 2022-02-24 | End: 2022-03-10

## 2022-02-24 RX ORDER — NITROFURANTOIN MACROCRYSTAL 50 MG
1 CAPSULE ORAL
Qty: 14 | Refills: 0
Start: 2022-02-24 | End: 2022-03-02

## 2022-02-28 LAB
CULTURE RESULTS: SIGNIFICANT CHANGE UP
SPECIMEN SOURCE: SIGNIFICANT CHANGE UP

## 2022-03-01 LAB
CULTURE RESULTS: SIGNIFICANT CHANGE UP
SPECIMEN SOURCE: SIGNIFICANT CHANGE UP

## 2022-08-23 PROBLEM — G30.9 ALZHEIMER'S DISEASE, UNSPECIFIED: Chronic | Status: ACTIVE | Noted: 2022-02-27

## 2022-08-25 ENCOUNTER — APPOINTMENT (OUTPATIENT)
Dept: CARDIOLOGY | Facility: CLINIC | Age: 82
End: 2022-08-25

## 2022-10-18 NOTE — PROGRESS NOTE BEHAVIORAL HEALTH - NSBHROSSYSTEMS_PSY_ALL_CORE
Procedure:  09052 - left Total Knee Arthroplasty  Patient's preferred date:  To be determined  Duration of Procedure: 1 1/2 hrs  Hospital:  [facility]  Anesthesia: General and Regional  Length of Stay: Inpatient  Equipment: - [employer]   Surgical Assist: Yes, PA  Consent: at OR Facility  Special Instructions: none  BMI - Estimated body mass index is 26.6 kg/m² as calculated from the following:    Height as of this encounter: 5' 10\" (1.778 m).    Weight as of this encounter: 84.1 kg (185 lb 6.5 oz).  STAAR:Yes  Drink Instructions: o ONLY clear liquids until 3 hours prior to surgery time, including finishing the pre surgery drink that has been provided.      Please coordinate:   - First postop visit at 10-14 days postop with MD.  - [facility] [employer]:  Yes  - Preop appointment with PA:  NO, we did it today  - Preop H&P / Testing to be done by: PCP  - Prehab: Yes  - Postop PT: Yes  
Spoke with patient about potential surgery dates. He will discuss with family and call back to schedule.  
Surgery date per patient request. Patient would like a late surgery time.  Insurance verified. Specialty Referral Req'd?:  N/A  Scheduled left TKA surgery and entered [id number] order via Case Request OR Order, case #[id number]  DOS: 1/4/23  Procedure Time: 1200  Arrival Time: 1000 @ [facility]  Patient does use [employer] but would like a letter mailed as well, notified of surgery information and instructions electronically and by mail.  Patient to schedule preop H&P and labs/EKG with PCP 2-3 wks prior to surgery, less than 30 days prior.  Preop covid test and postop appts scheduled.  Staff message sent to MD staff to place presurgical covid test order.  Staff message sent to PT @ [place] to schedule prehab/postop appts.  Updated [place] Calendar.  
Neurological.../Psychiatric

## 2023-02-09 ENCOUNTER — EMERGENCY (EMERGENCY)
Facility: HOSPITAL | Age: 83
LOS: 0 days | Discharge: HOME | End: 2023-02-09
Attending: EMERGENCY MEDICINE
Payer: MEDICARE

## 2023-02-09 VITALS
HEART RATE: 79 BPM | DIASTOLIC BLOOD PRESSURE: 79 MMHG | OXYGEN SATURATION: 97 % | RESPIRATION RATE: 17 BRPM | SYSTOLIC BLOOD PRESSURE: 144 MMHG | TEMPERATURE: 98 F

## 2023-02-09 VITALS — WEIGHT: 130.07 LBS

## 2023-02-09 DIAGNOSIS — G30.9 ALZHEIMER'S DISEASE, UNSPECIFIED: ICD-10-CM

## 2023-02-09 DIAGNOSIS — F02.80 DEMENTIA IN OTHER DISEASES CLASSIFIED ELSEWHERE, UNSPECIFIED SEVERITY, WITHOUT BEHAVIORAL DISTURBANCE, PSYCHOTIC DISTURBANCE, MOOD DISTURBANCE, AND ANXIETY: ICD-10-CM

## 2023-02-09 DIAGNOSIS — K62.89 OTHER SPECIFIED DISEASES OF ANUS AND RECTUM: ICD-10-CM

## 2023-02-09 DIAGNOSIS — I44.4 LEFT ANTERIOR FASCICULAR BLOCK: ICD-10-CM

## 2023-02-09 DIAGNOSIS — Z20.822 CONTACT WITH AND (SUSPECTED) EXPOSURE TO COVID-19: ICD-10-CM

## 2023-02-09 DIAGNOSIS — K62.3 RECTAL PROLAPSE: ICD-10-CM

## 2023-02-09 LAB
ABO RH CONFIRMATION: SIGNIFICANT CHANGE UP
ALBUMIN SERPL ELPH-MCNC: 4 G/DL — SIGNIFICANT CHANGE UP (ref 3.3–5)
ALP SERPL-CCNC: 89 U/L — SIGNIFICANT CHANGE UP (ref 40–120)
ALT FLD-CCNC: 17 U/L — SIGNIFICANT CHANGE UP (ref 12–78)
ANION GAP SERPL CALC-SCNC: 6 MMOL/L — SIGNIFICANT CHANGE UP (ref 5–17)
APTT BLD: 28.8 SEC — SIGNIFICANT CHANGE UP (ref 27.5–35.5)
AST SERPL-CCNC: 26 U/L — SIGNIFICANT CHANGE UP (ref 15–37)
BASOPHILS # BLD AUTO: 0.04 K/UL — SIGNIFICANT CHANGE UP (ref 0–0.2)
BASOPHILS NFR BLD AUTO: 0.5 % — SIGNIFICANT CHANGE UP (ref 0–2)
BILIRUB SERPL-MCNC: 0.6 MG/DL — SIGNIFICANT CHANGE UP (ref 0.2–1.2)
BUN SERPL-MCNC: 16 MG/DL — SIGNIFICANT CHANGE UP (ref 7–23)
CALCIUM SERPL-MCNC: 9.5 MG/DL — SIGNIFICANT CHANGE UP (ref 8.5–10.1)
CHLORIDE SERPL-SCNC: 107 MMOL/L — SIGNIFICANT CHANGE UP (ref 96–108)
CO2 SERPL-SCNC: 26 MMOL/L — SIGNIFICANT CHANGE UP (ref 22–31)
CREAT SERPL-MCNC: 0.95 MG/DL — SIGNIFICANT CHANGE UP (ref 0.5–1.3)
EGFR: 61 ML/MIN/1.73M2 — SIGNIFICANT CHANGE UP
EOSINOPHIL # BLD AUTO: 0.04 K/UL — SIGNIFICANT CHANGE UP (ref 0–0.5)
EOSINOPHIL NFR BLD AUTO: 0.5 % — SIGNIFICANT CHANGE UP (ref 0–6)
FLUAV AG NPH QL: SIGNIFICANT CHANGE UP
FLUBV AG NPH QL: SIGNIFICANT CHANGE UP
GLUCOSE SERPL-MCNC: 110 MG/DL — HIGH (ref 70–99)
HCT VFR BLD CALC: 38 % — SIGNIFICANT CHANGE UP (ref 34.5–45)
HGB BLD-MCNC: 13 G/DL — SIGNIFICANT CHANGE UP (ref 11.5–15.5)
IMM GRANULOCYTES NFR BLD AUTO: 0.4 % — SIGNIFICANT CHANGE UP (ref 0–0.9)
INR BLD: 0.98 RATIO — SIGNIFICANT CHANGE UP (ref 0.88–1.16)
LYMPHOCYTES # BLD AUTO: 1.59 K/UL — SIGNIFICANT CHANGE UP (ref 1–3.3)
LYMPHOCYTES # BLD AUTO: 19.8 % — SIGNIFICANT CHANGE UP (ref 13–44)
MCHC RBC-ENTMCNC: 31.3 PG — SIGNIFICANT CHANGE UP (ref 27–34)
MCHC RBC-ENTMCNC: 34.2 GM/DL — SIGNIFICANT CHANGE UP (ref 32–36)
MCV RBC AUTO: 91.3 FL — SIGNIFICANT CHANGE UP (ref 80–100)
MONOCYTES # BLD AUTO: 0.97 K/UL — HIGH (ref 0–0.9)
MONOCYTES NFR BLD AUTO: 12.1 % — SIGNIFICANT CHANGE UP (ref 2–14)
NEUTROPHILS # BLD AUTO: 5.35 K/UL — SIGNIFICANT CHANGE UP (ref 1.8–7.4)
NEUTROPHILS NFR BLD AUTO: 66.7 % — SIGNIFICANT CHANGE UP (ref 43–77)
PLATELET # BLD AUTO: 241 K/UL — SIGNIFICANT CHANGE UP (ref 150–400)
POTASSIUM SERPL-MCNC: 3.9 MMOL/L — SIGNIFICANT CHANGE UP (ref 3.5–5.3)
POTASSIUM SERPL-SCNC: 3.9 MMOL/L — SIGNIFICANT CHANGE UP (ref 3.5–5.3)
PROT SERPL-MCNC: 8 GM/DL — SIGNIFICANT CHANGE UP (ref 6–8.3)
PROTHROM AB SERPL-ACNC: 11.4 SEC — SIGNIFICANT CHANGE UP (ref 10.5–13.4)
RBC # BLD: 4.16 M/UL — SIGNIFICANT CHANGE UP (ref 3.8–5.2)
RBC # FLD: 13.5 % — SIGNIFICANT CHANGE UP (ref 10.3–14.5)
RSV RNA NPH QL NAA+NON-PROBE: SIGNIFICANT CHANGE UP
SARS-COV-2 RNA SPEC QL NAA+PROBE: SIGNIFICANT CHANGE UP
SODIUM SERPL-SCNC: 139 MMOL/L — SIGNIFICANT CHANGE UP (ref 135–145)
WBC # BLD: 8.02 K/UL — SIGNIFICANT CHANGE UP (ref 3.8–10.5)
WBC # FLD AUTO: 8.02 K/UL — SIGNIFICANT CHANGE UP (ref 3.8–10.5)

## 2023-02-09 PROCEDURE — 71045 X-RAY EXAM CHEST 1 VIEW: CPT | Mod: 26

## 2023-02-09 PROCEDURE — 0241U: CPT

## 2023-02-09 PROCEDURE — 99285 EMERGENCY DEPT VISIT HI MDM: CPT | Mod: 25

## 2023-02-09 PROCEDURE — 93005 ELECTROCARDIOGRAM TRACING: CPT

## 2023-02-09 PROCEDURE — 99284 EMERGENCY DEPT VISIT MOD MDM: CPT

## 2023-02-09 PROCEDURE — 86850 RBC ANTIBODY SCREEN: CPT

## 2023-02-09 PROCEDURE — 85025 COMPLETE CBC W/AUTO DIFF WBC: CPT

## 2023-02-09 PROCEDURE — 85610 PROTHROMBIN TIME: CPT

## 2023-02-09 PROCEDURE — 36415 COLL VENOUS BLD VENIPUNCTURE: CPT

## 2023-02-09 PROCEDURE — 80053 COMPREHEN METABOLIC PANEL: CPT

## 2023-02-09 PROCEDURE — 85730 THROMBOPLASTIN TIME PARTIAL: CPT

## 2023-02-09 PROCEDURE — 71045 X-RAY EXAM CHEST 1 VIEW: CPT

## 2023-02-09 PROCEDURE — 86901 BLOOD TYPING SEROLOGIC RH(D): CPT

## 2023-02-09 PROCEDURE — 86900 BLOOD TYPING SEROLOGIC ABO: CPT

## 2023-02-09 PROCEDURE — 93010 ELECTROCARDIOGRAM REPORT: CPT

## 2023-02-09 NOTE — ED ADULT NURSE NOTE - NSIMPLEMENTINTERV_GEN_ALL_ED
Implemented All Fall with Harm Risk Interventions:  [place] to call system. Call bell, personal items and telephone within reach. Instruct patient to call for assistance. Room bathroom lighting operational. Non-slip footwear when patient is off stretcher. Physically safe environment: no spills, clutter or unnecessary equipment. Stretcher in lowest position, wheels locked, appropriate side rails in place. Provide visual cue, wrist band, yellow gown, etc. Monitor gait and stability. Monitor for mental status changes and reorient to person, place, and time. Review medications for side effects contributing to fall risk. Reinforce activity limits and safety measures with patient and family. Provide visual clues: red socks.
2017 08:12

## 2023-02-09 NOTE — ED PROVIDER NOTE - UNABLE TO OBTAIN
Unable to obtain ROS due to pt's baseline mental status due to Alzheimer's Disease dementia. Dementia HPI obtained from daughter at bedside. Unable to obtain HPI from pt due to baseline mental status and Alzheimer's Disease dementia.

## 2023-02-09 NOTE — ED ADULT TRIAGE NOTE - CHIEF COMPLAINT QUOTE
Patient brought in by daughter c/o rectal pain. Has a prolapse and is supposed to get surgery but the pain is too much. Patient has dementia, history provided by daughter.

## 2023-02-09 NOTE — ED PROVIDER NOTE - NSFOLLOWUPINSTRUCTIONS_ED_ALL_ED_FT
Continue current medications as per routine.  Encourage oral fluids, stay well hydrated.  Continue oral laxatives so as to avoid straining for BMs.  Follow up own primary doctor next 2 - 3 days.  Follow up with own [facility] surgeon.        Rectal Prolapse, Adult    Side view of the lower digestive system, showing the large intestine and anus, with a close-up of a prolapsed rectum.   Rectal prolapse happens when the inside of the last section of the large intestine (rectum) drops down into an abnormal position. There are two types of rectal prolapse:  •Partial. In partial rectal prolapse, the inner lining of the rectum falls or sinks out of place and may stick out of the anus.      •Complete. In complete rectal prolapse, all layers of the rectum fall or sink out of place and may stick out of the anus.      At first, rectal prolapse may be temporary. It may happen only when you are having a bowel movement. Over time, the prolapse will likely get worse. It may start to happen more often and cause uncomfortable symptoms. Eventually, the prolapse may happen when you are walking or standing. Surgery is often needed for this condition.      What are the causes?    This condition may be caused by weakness in the muscles that attach the rectum to the inside of the lower abdomen. The exact cause of this muscle weakness is often not known.      What increases the risk?    You are more likely to develop this condition if you:  •Have a history of chronic constipation or diarrhea.      •Frequently strain to have a bowel movement.      •Are a woman who is 50 years of age or older.      •Have a neurological disorder, such as multiple sclerosis, or lower spinal cord injury.      •Are a woman who has been pregnant many times.      •Have had pelvic or rectal surgery.      •Have cystic fibrosis.        What are the signs or symptoms?    The main symptom of this condition is a red mass of tissue sticking out of your anus. The mass may appear inflamed, have mucus, or bleed slightly.  •At first, the mass may only appear after a bowel movement.      •The mass may then start to appear more often.      Other symptoms may include:  •Discomfort in the anus and rectum.      •Constipation.      •Feeling that stool is not completely emptied from the rectum.      •Diarrhea.      •Leaking of stool, mucus, or blood from the anus (fecal incontinence).      •Feeling like you are sitting on a ball.        How is this diagnosed?    This condition may be diagnosed based on your symptoms and a physical exam.  •During the exam, you may be asked to squat and strain as though you are having a bowel movement.    •You may also have tests, such as:  •A rectal exam using a flexible scope (sigmoidoscopy or colonoscopy).      •A procedure that involves taking X-rays of your rectum after a dye (contrast material) is injected into the rectum (defecogram).          How is this treated?    This condition is usually treated with surgery to repair the weakened muscles and to reconnect the rectum to attachments inside the lower abdomen. Other treatment options may include:•Pushing the prolapsed area back into the rectum (reduction).  •Your health care provider may do this by gently pushing it back in using a moist cloth.      •The health care provider may show you how to do this at home if the prolapse occurs again.        •Medicines to prevent constipation and straining. This may include laxatives or stool softeners.        Follow these instructions at home:      General instructions   A sign showing that a person should not lift anything heavy.   •Take over-the-counter and prescription medicines only as told by your health care provider.      • Do not strain to have a bowel movement.      • Do not lift anything that is heavier than 10 lb (4.5 kg), or the limit that you are told, until your health care provider says that it is safe.      •Follow instructions from your health care provider about what to do if the prolapse occurs again and does not go back in. This may involve lying on your side and using a moist cloth to gently press the lump into your rectum.      •Keep all follow-up visits as told by your health care provider. This is important.        Preventing constipation   Three cups showing dark yellow, yellow, and pale yellow urine.   To prevent or treat constipation, your health care provider may recommend that you:  •Drink enough fluid to keep your urine pale yellow.      •Take over-the-counter or prescription medicines.      •Eat foods that are high in fiber, such as beans, whole grains, and fresh fruits and vegetables.      •Limit foods that are high in fat and processed sugars, such as fried or sweet foods.        Contact a health care provider if:    •You have a fever.      •Your prolapse cannot be reduced at home.      •You have constipation or diarrhea.      •You have mild rectal bleeding.        Get help right away if:    •You have very bad rectal pain.      •You bleed heavily from your rectum.        Summary    •Rectal prolapse happens when the inside of the rectum drops down into an abnormal position. In some cases, the rectum may partially or completely push out through the anal opening.      •This condition is usually treated with surgery to repair the weakened muscles and to reconnect the rectum to attachments inside the lower abdomen.      •Take over-the-counter and prescription medicines only as told by your health care provider.      •Follow instructions from your health care provider about what to do if the prolapse occurs again and does not go back in.      •Get help right away if you have very bad rectal pain or if you bleed heavily from your rectum.      This information is not intended to replace advice given to you by your health care provider. Make sure you discuss any questions you have with your health care provider.

## 2023-02-09 NOTE — ED PROVIDER NOTE - CLINICAL SUMMARY MEDICAL DECISION MAKING FREE TEXT BOX
81 y/o female +Alzheimer's Dementia, known rectal prolapse scheduled for elective surgery in 2 weeks brought in by family after c/o rectal pain this morning. In ED pt asymptomatic. Physical exam unremarkable including no rectal prolapse. Plan: Labs and EKG. Pt and family reassured, continue laxative to avoid straining during BMs, follow up with scheduled surgery. 83 y/o female, +Alzheimer's dementia, known rectal prolapse scheduled for elective surgery in 2 weeks brought in by family after c/o increased rectal pain this morning. In ED pt asymptomatic. Physical exam unremarkable including no rectal prolapse.   Plan: Labs and EKG done: no acute abnl. Pt and family reassured, continue laxative to avoid straining during BMs, follow up with scheduled surgery.

## 2023-02-09 NOTE — ED PROVIDER NOTE - OBJECTIVE STATEMENT
83 y/o female PMHx of Alzheimer's disease dementia presents to ED brought in by daughter c/o rectal pain worsening since this morning. At this time, pt reports no rectal pain but is a poor historian due to dementia. Daughter at bedside reports pt has a rectal prolapse and has had rectal bleeding for the last couple months. She reports the pt is supposed to get surgery with Dr. Jaquez on 2/27/23 but the pain is too much. Pt denies nausea, vomiting, and abdominal pain. Pt is on laxatives to avoid straining for BMs. Daughter reports no known allergies to medication. PCP Dr. Shah in [address]. 83 y/o female PMHx of Alzheimer's disease dementia BIB daughter ambulatory to ED c/o rectal pain worsening since this morning. At this time, pt reports no rectal pain but is a poor historian due to dementia. Daughter at bedside reports pt has a h/o rectal prolapse and has had rectal bleeding for the last couple months. She reports the pt is scheduled to have surgery for the prolapse with Dr. Jaquez on 2/27/23 but the pain was too much earlier today. Pt denies nausea, vomiting, and abdominal pain. Pt is on laxatives to avoid straining for BMs. Daughter reports no known allergies to medication.   PCP: Dr. Shah in [address].

## 2023-02-09 NOTE — ED PROVIDER NOTE - GASTROINTESTINAL, MLM
Abdomen soft, non-tender, no guarding. Bowel sounds+. Abdomen soft, non-tender, no guarding. Bowel sounds+.  No current rectal prolapse. No rectal TTP.

## 2023-02-09 NOTE — ED PROVIDER NOTE - MUSCULOSKELETAL, MLM
Spine appears normal, range of motion is not limited, no muscle or joint tenderness. MAEx4. No focal swelling or TTP. No prolapse. No rectal TTP. Spine appears normal, range of motion is not limited, no muscle or joint tenderness. MAEx4. No focal swelling or TTP.

## 2023-02-09 NOTE — ED PROVIDER NOTE - PATIENT PORTAL LINK FT
You can access the FollowMyHealth Patient Portal offered by [facility] by registering at the following website: [email or web address]. By joining [facility]’s FollowMyHealth portal, you will also be able to view your health information using other applications (apps) compatible with our system.

## 2023-02-09 NOTE — ED PROVIDER NOTE - NEUROLOGICAL, MLM
Alert and oriented, no focal deficits, no motor or sensory deficits. Alert, no focal deficits, no motor or sensory deficits.  CNs intact, normal speech.

## 2023-02-13 ENCOUNTER — TRANSCRIPTION ENCOUNTER (OUTPATIENT)
Age: 83
End: 2023-02-13

## 2023-02-27 ENCOUNTER — INPATIENT (INPATIENT)
Facility: HOSPITAL | Age: 83
LOS: 0 days | Discharge: HOME | End: 2023-02-27

## 2023-02-27 VITALS
SYSTOLIC BLOOD PRESSURE: 138 MMHG | WEIGHT: 130.07 LBS | HEART RATE: 83 BPM | OXYGEN SATURATION: 100 % | TEMPERATURE: 98 F | DIASTOLIC BLOOD PRESSURE: 79 MMHG | RESPIRATION RATE: 16 BRPM

## 2023-02-27 DIAGNOSIS — K62.3 RECTAL PROLAPSE: ICD-10-CM

## 2023-02-27 DIAGNOSIS — J38.2 NODULES OF VOCAL CORDS: ICD-10-CM

## 2023-02-27 NOTE — H&P ADULT - NSHPPHYSICALEXAM_GEN_ALL_CORE
NAD, resting in chair  Alert, responding appropriately  Non labored respirations  Soft, NT, ND, no rebound/guarding  WWP

## 2023-02-27 NOTE — ASU PREOP CHECKLIST - TO WHOM
RONNIE/GRACE RONNIE/GRACE patient surgery cancelled by Dr Jaquez/ patient refused surgery/ IV removed

## 2023-02-27 NOTE — ASU PATIENT PROFILE, ADULT - CAREGIVER
Leave incision open to air.   May shower daily. Do not scrub incision, let warm soapy water wash over area. Pat dry.   Do not submerge in water, no baths or hot tubs.   Call office if noticed any redness or discharge draining from incision.     Okay to resume ibrutinib on 6/14   Yes

## 2023-02-27 NOTE — H&P ADULT - ASSESSMENT
82F hx Alzehimer's dementia presenting with symptomatic rectal prolapse    - OR with Dr. Jaquez today for mucosal resection

## 2023-02-27 NOTE — H&P ADULT - HISTORY OF PRESENT ILLNESS
81 y/o female PMHx of Alzheimer's disease dementia BIB who presents with rectal pain.Patient is a poor historian due to dementia. Daughter at bedside reports pt has a h/o rectal prolapse and has had rectal bleeding for the last couple months. She reports the pt is scheduled to have surgery for the prolapse with Dr. Jaquez on 2/27/23. Daughter reports no known allergies to medication.     PCP: Dr. Shah in [address].

## 2024-08-09 PROBLEM — N39.0 UTI (URINARY TRACT INFECTION): Status: ACTIVE | Noted: 2024-08-09 | Resolved: 2024-09-08

## 2024-10-08 ENCOUNTER — NON-APPOINTMENT (OUTPATIENT)
Age: 84
End: 2024-10-08

## 2024-10-09 ENCOUNTER — APPOINTMENT (OUTPATIENT)
Dept: COLORECTAL SURGERY | Facility: CLINIC | Age: 84
End: 2024-10-09
Payer: MEDICARE

## 2024-10-09 VITALS
HEIGHT: 60 IN | RESPIRATION RATE: 15 BRPM | DIASTOLIC BLOOD PRESSURE: 72 MMHG | BODY MASS INDEX: 22.58 KG/M2 | TEMPERATURE: 97.6 F | WEIGHT: 115 LBS | SYSTOLIC BLOOD PRESSURE: 148 MMHG | HEART RATE: 68 BPM | OXYGEN SATURATION: 97 %

## 2024-10-09 DIAGNOSIS — K62.3 RECTAL PROLAPSE: ICD-10-CM

## 2024-10-09 PROCEDURE — 46600 DIAGNOSTIC ANOSCOPY SPX: CPT

## 2024-10-09 PROCEDURE — 99203 OFFICE O/P NEW LOW 30 MIN: CPT | Mod: 25

## 2025-01-27 DIAGNOSIS — B37.9 CANDIDIASIS, UNSPECIFIED: ICD-10-CM

## 2025-01-27 RX ORDER — FLUCONAZOLE 150 MG/1
150 TABLET ORAL
Qty: 3 | Refills: 0 | Status: ACTIVE | COMMUNITY
Start: 2025-01-27 | End: 1900-01-01

## 2025-06-04 NOTE — ED PROVIDER NOTE - EKG ADDITIONAL QUESTION - PERFORMED INDEPENDENT VISUALIZATION
Detail Level: Zone
Continue Regimen: Topical Triamcinolone (prescribed by another provider) PRN flares. Denies needing refills at this time.
Yes